# Patient Record
Sex: FEMALE | Race: WHITE | NOT HISPANIC OR LATINO | Employment: UNEMPLOYED | URBAN - METROPOLITAN AREA
[De-identification: names, ages, dates, MRNs, and addresses within clinical notes are randomized per-mention and may not be internally consistent; named-entity substitution may affect disease eponyms.]

---

## 2024-01-23 ENCOUNTER — TELEPHONE (OUTPATIENT)
Dept: MATERNAL FETAL MEDICINE | Facility: CLINIC | Age: 29
End: 2024-01-23
Payer: OTHER GOVERNMENT

## 2024-01-23 NOTE — TELEPHONE ENCOUNTER
Mrs. Herrmann was scheduled (1/18/24) for initial MFM consultation on 2/5/24 for possible retroplacental hematoma on US. She miscarried on the evening of 1/21/24.  1/22/24 - Dr. Short requested MFM consultation for recurrent losses; 2 early SAB's and this loss at 18 weeks. Situation discussed with Dr. Shane Handley and he will call Mrs. Herrmann and do a prepregnancy phone consult with the patient if she is agreeable, due to most insurances will not cover a visit for this.  I called her and explained above, she is very grateful and receptive to speaking with Dr. Handley by phone. She understands that once we receive Dr. Beth's discharge note from delivery, that Dr. Handley will contact her after he reviews her records in [facility]., and that it may take a week or more due to his busy schedule. She states understanding and agreement.

## 2024-03-07 ENCOUNTER — TELEPHONE (OUTPATIENT)
Dept: MATERNAL FETAL MEDICINE | Facility: CLINIC | Age: 29
End: 2024-03-07
Payer: OTHER GOVERNMENT

## 2024-03-07 NOTE — TELEPHONE ENCOUNTER
We received a request for pre pregnancy consultation on the above captioned patient from Dr. Judit Short at [facility].  I offered to do it via review of her records and phone call a free of charge.    She is 28 years of age a  3 para 0030.  She had 2 early 1st trimester losses and then an 18 week loss.  She had a a known uterine septum that was apparently diagnosed after her 1st loss.  She never had evaluation for possible removal of it.    I discussed with the patient that uterine septums are known causes of 1st trimester and 2nd trimester losses.  If it gestation implants on the uterine septum the likelihood of early losses high because the septum typically has a poor blood supply compared to that of normal endometrial tissue.  Whether not played a role in the 18 week loss is difficult to know.  Looks like there was a retroplacental hematoma and then cervical shortening, bleeding and then subsequent loss.  Based on the records I roots reviewed it was not consistent with cervical incompetence    My recommendations are as follows:  1. Hysterosalpingogram or sonohystogram or would ever the current preferred method is for evaluating for the presence of uterine septums.   2. If the septum is present I would refer to reproductive endocrinologist for possible septum excision which is greatly improved the likelihood of a good outcome with future pregnancies.  3. Skipping step 1 above is also completely acceptable and just directly referring to reproductive endocrinologist and they can perform the imaging evaluation  4. When she becomes pregnant again we would like to see her around 14-16 weeks gestation    Thank you very much for the referral and please call with questions.    Shane Handley Jr., MD  Director of Maternal Fetal Medicine  [facility], [facility]  Main: [phone]  Fax: [phone]

## 2024-08-22 DIAGNOSIS — Q51.28 SEPTATE UTERUS AFFECTING PREGNANCY IN SECOND TRIMESTER: Primary | ICD-10-CM

## 2024-08-22 DIAGNOSIS — O34.02 SEPTATE UTERUS AFFECTING PREGNANCY IN SECOND TRIMESTER: Primary | ICD-10-CM

## 2024-09-09 ENCOUNTER — OFFICE VISIT (OUTPATIENT)
Dept: MATERNAL FETAL MEDICINE | Facility: CLINIC | Age: 29
End: 2024-09-09
Payer: OTHER GOVERNMENT

## 2024-09-09 VITALS
SYSTOLIC BLOOD PRESSURE: 110 MMHG | RESPIRATION RATE: 18 BRPM | OXYGEN SATURATION: 99 % | HEIGHT: 59 IN | WEIGHT: 96 LBS | HEART RATE: 78 BPM | DIASTOLIC BLOOD PRESSURE: 70 MMHG | BODY MASS INDEX: 19.35 KG/M2

## 2024-09-09 DIAGNOSIS — Q51.28 SEPTATE UTERUS AFFECTING PREGNANCY IN SECOND TRIMESTER: ICD-10-CM

## 2024-09-09 DIAGNOSIS — O34.02 SEPTATE UTERUS AFFECTING PREGNANCY IN SECOND TRIMESTER: ICD-10-CM

## 2024-09-09 RX ORDER — PYRIDOXINE HCL (VITAMIN B6) 25 MG
TABLET ORAL
COMMUNITY
Start: 2024-08-20

## 2024-09-09 RX ORDER — ONDANSETRON HYDROCHLORIDE 4 MG/1
4 TABLET, FILM COATED ORAL
COMMUNITY
Start: 2024-01-17

## 2024-09-09 RX ORDER — PNV,CALCIUM 72/IRON/FOLIC ACID 27 MG-1 MG
TABLET ORAL
COMMUNITY
Start: 2024-07-09

## 2024-09-09 RX ORDER — ACETAMINOPHEN 325 MG/1
TABLET ORAL
COMMUNITY
Start: 2024-01-22 | End: 2025-01-21

## 2024-09-09 NOTE — LETTER
September 9, 2024        [facility] - Maternal Fetal Medicine  [address]  [place] [address]  Phone: [phone]  Fax: [phone]   Patient: Kierra Herrmann   MR Number: [id number]   YOB: 1995   Date of Visit: 9/9/2024       Dear Dr. Elisabeth Ferrell:    Thank you for referring Kierra Herrmann to me for evaluation. Attached you will find relevant portions of my assessment and plan of care.    If you have questions, please do not hesitate to call me. I look forward to following Kierra Herrmann along with you.    Sincerely,      Rich Ontiveros MD            [facility]

## 2024-09-09 NOTE — PROGRESS NOTES
Initial M Consultation Note  Rich Ontiveros MD    Reason for consultation:    1. Pregnancy at fifteen weeks gestation  2. Recurrent pregnancy loss  3. Apparent uterine septum.  4. History of anxiety, depression, and bipolar disease   5. Short interval between pregnancies     Dear Dr. Ferrell    Today, I had the opportunity to see your patient for initial Maternal Fetal medicine assessment at [facility].  I appreciate your request for both imaging and consultation.Your patient is a 28-year-old  4 para 0030.  Her last pregnancy was a loss at approximately 18 weeks secondary to ruptured membranes.  The patient did have a pre conceptual consult with my partner Dr. Handley in March.  His recommendation was for further evaluation of the uterine septum and potential removal by hysteroscope.  That did not happen and the patient became spontaneously pregnant.    Past medical history:  The patient denies major medical illnesses such as hypertension, diabetes, cardiac disease, pulmonary disease, renal disease, autoimmune disease, spontaneous thrombosis, thyroid dysfunction, and neurologic disease.    Obstetric history:  Two early pregnancy losses followed by an 18 week loss secondary to ruptured membranes.  Her current pregnancy did have a subchorionic bleed early in pregnancy.  Early imaging showed the pregnancy to be on the left side of the uterus.    Family history:  Noncontributory.  The patient denies a history of congenital cardiac disease, open neural tube defect, aneuploidy, Down syndrome, and common single gene disorders.    Social history:   The patient denies tobacco use, alcohol use, and recreational drug use.    Review of systems:  The patient has no somatic complaints today.  She denies vaginal bleeding, leakage of fluid, and pelvic pressure.  Overall, she states she feels well.    Physical examination  General:  Is a well-developed well-nourished female in no apparent distress  Vital  signs:  Blood pressure 110/70, pulse 78, respirations 18, weight 96 lb.  HEENT:  Grossly within normal limits.  There is no obvious facial edema.  The sclera appear normal.  The facial muscles appear normal.  Abdomen:  Benign exam.  The uterus is nontender to palpation.  The uterus is appropriate size for gestational age.  Extremities:  No ankle edema is palpable.  Neuro:  Grossly intact.  The patient is alert and oriented x3.  She ambulates without issue.  Psych:  The patient is appropriate for mood and affect.    Imaging:  Fetal imaging was provided today.  A full ultrasound report has been created on the [employer] system.  The full report will be placed in the EMR and also forwarded to you separately.  In summary, initial imaging is indicative of a normal developing fetus.  Anatomic visualization of 15 weeks was limited.  The fluid volume is normal.  The placenta is anterior without evidence of subchorionic hemorrhage today.  The uterine septum is part of the mullerian group of uterine abnormalities.  It can include a cervical problem.  Therefore we proceeded with a vaginal probe assessment of the cervix.  The anatomic position of the cervix was normal.  The cervical length was normal at 37 mm.  No funneling of the internal cervical os was noted.        Counseling:  I discussed the clinical situation with the patient.  I informed her that I was happy with the progress of this pregnancy and that I did not see a septum.  Hopefully, it has been compressed by the pregnancy in his over to 1 side.  However, the patient was advised that there is still a chance for cervical change and rupture of membranes.  I advised that I did not see any evidence of a retroplacental bleed and this is reassuring.  I counseled the patient that regardless of the result of this pregnancy, she should definitely go on contraception, either short-acting her long-acting, and have the uterine septum removed.  She was advised that we will see  her back here in 3 weeks to reassess the pregnancy with a vaginal probe assessment of the cervix and also an assessment of fetal growth and development    Impression:  Pregnancy at fifteen weeks with a uterine septum.  Three prior pregnancy losses.  This pregnancy appears to be progressing well and has a normal cervical length.    Recommendations:    1. With your permission we will see the patient back in 3 weeks    2. If the patient has symptoms before then, please give us a call and we can try to work the patient and to our schedule in [place] as the patient may require a cerclage and we can do that in [place].    3. As per the body of the consultation, patient was counseled that the completion of this pregnancy she should initiate contraception and undergo hysteroscopy with a probable removal of the uterine septum.      Please call me or one of my partners in [place] if you have any question about your patient at [phone].  Thank you very much for the opportunity to see your patient.    Sincerely, Rcih Ontiveros MD

## 2024-09-09 NOTE — PROGRESS NOTES
"Kierra is here for initial MFM consultation, referred by Dr. Elisabeth Ferrell at [facility] for poor OB history, possible cerclage candidate, L ovarian echogenic foci, septate uterus, and small subchorionic hemorrhage.    She is not feeling fetal movement.    Kierra denies vaginal bleeding, loss of fluid, recurrent cramping.    She is taking Zofran prn.      Vitals:    09/09/24 1053   BP: 110/70   Pulse: 78   Resp: 18   SpO2: 99%   Weight: 43.5 kg (96 lb)   Height: 4' 11" (1.499 m)      BMI:                    19.39 kg/m^2               "

## 2024-09-24 DIAGNOSIS — O34.02 SEPTATE UTERUS AFFECTING PREGNANCY IN SECOND TRIMESTER: Primary | ICD-10-CM

## 2024-09-24 DIAGNOSIS — Q51.28 SEPTATE UTERUS AFFECTING PREGNANCY IN SECOND TRIMESTER: Primary | ICD-10-CM

## 2024-10-03 ENCOUNTER — PROCEDURE VISIT (OUTPATIENT)
Dept: MATERNAL FETAL MEDICINE | Facility: CLINIC | Age: 29
End: 2024-10-03
Payer: OTHER GOVERNMENT

## 2024-10-03 VITALS
HEART RATE: 95 BPM | WEIGHT: 99 LBS | OXYGEN SATURATION: 98 % | RESPIRATION RATE: 20 BRPM | SYSTOLIC BLOOD PRESSURE: 106 MMHG | BODY MASS INDEX: 20 KG/M2 | DIASTOLIC BLOOD PRESSURE: 72 MMHG

## 2024-10-03 DIAGNOSIS — Q51.28 SEPTATE UTERUS AFFECTING PREGNANCY IN SECOND TRIMESTER: ICD-10-CM

## 2024-10-03 DIAGNOSIS — O34.02 SEPTATE UTERUS AFFECTING PREGNANCY IN SECOND TRIMESTER: ICD-10-CM

## 2024-10-03 NOTE — PROGRESS NOTES
Follow-Up MFM Consultation Note    Reason for consultation:    1. Pregnancy at 18 weeks and 4 days   2. Recurrent pregnancy loss  3. Possible uterine septum.  4. History of anxiety, depression, and bipolar disease   5. Short interval between pregnancies     Dear Dr. Ferrell    Today, I had the opportunity to see your patient for follow-up Maternal Fetal medicine assessment at [facility].  I appreciate your request for both imaging and consultation.Your patient is a 28-year-old  4 para 0030.  Her last pregnancy was a loss at approximately 18 weeks secondary to ruptured membranes.  The patient did have a pre conceptual consult with my partner Dr. Handley in March.  His recommendation was for further evaluation of the uterine septum and potential removal by hysteroscope.  That did not happen and the patient became spontaneously pregnant. With her last visit here we could not appreciate the uterine septum.        Review of systems:  The patient has no somatic complaints today.  She denies vaginal bleeding, leakage of fluid, and pelvic pressure.  Overall, she states she feels well.    Physical examination  /72   Pulse 95   Resp 20   Wt 44.9 kg (99 lb)   SpO2 98%   BMI 20.00 kg/m²    Physical exam:  Gen: WDWN in NAD  HEENT: WNL  Abdomen: Soft, non-tender, non-distended   Skin: No rash or jaundice  Extremities: Symmetric in size, no clubbing, cyanosis, or edema  Neuro: Grossly intact      Imaging:  Single intrauterine pregnancy measuring 17 weeks and 6 days with an estimated fetal weight of 217g.  This is consistent with a previously determined ROQUE.  The fetus is in the vertex presentation.  The placenta is anterior.  The amniotic fluid is normal.  Completion of the anatomical survey was performed and there are no structural anomalies or aneuploidy on ultrasound today.  TVUS was used to assess the cervical length. It is normal at 39.0mm without evidence of funneling or shortening    Impression:  Reassuring fetal growth, fluid and anatomy. Normal Cervical length       Recommendations:    1. With your permission we will see the patient back in 4 weeks to reassess the cervical length one last time.     2. Please repeat cervical length in your office in 2 weeks.  If it were to be less than 2.5cm please let us know and we can arrange for a cervical cerclage.      3. As per the body of the consultation, patient was counseled that the completion of this pregnancy she should initiate contraception and undergo hysteroscopy with a probable removal of the uterine septum.      Please call me or one of my partners in [place] if you have any question about your patient at [phone].  Thank you very much for the opportunity to see your patient.    Sincerely,     Faby Rockwell, DO

## 2024-10-03 NOTE — LETTER
October 3, 2024        [facility] - Maternal Fetal Medicine  [address]  [place] [address]  Phone: [phone]  Fax: [phone]   Patient: Kierra Herrmann   MR Number: [id number]   YOB: 1995   Date of Visit: 10/3/2024       Dear Dr. Elisabeth Ferrell:    Thank you for referring Kierra Herrmann to me for evaluation. Below are the relevant portions of my assessment and plan of care.            If you have questions, please do not hesitate to call me. I look forward to following Kierra along with you.    Sincerely,      Faby Rockwell, DO           CC  [employer]

## 2024-10-03 NOTE — PROGRESS NOTES
Kierra is here for followup [facility] consultation for poor OB history and septate uterus, referred by Dr. Elisabeth Ferrell at [facility].    She is not yet feeling fetal movement.    Kierra denies vaginal bleeding, loss of fluid, recurrent cramping.    Vitals:    10/03/24 1027   BP: 106/72   Pulse: 95   Resp: 20   SpO2: 98%   Weight: 44.9 kg (99 lb)     BMI:                    20 kg/m^2

## 2024-10-22 DIAGNOSIS — Q51.28 SEPTATE UTERUS AFFECTING PREGNANCY IN SECOND TRIMESTER: Primary | ICD-10-CM

## 2024-10-22 DIAGNOSIS — O34.02 SEPTATE UTERUS AFFECTING PREGNANCY IN SECOND TRIMESTER: Primary | ICD-10-CM

## 2024-10-22 DIAGNOSIS — O09.299 PRIOR POOR OBSTETRICAL HISTORY, ANTEPARTUM: ICD-10-CM

## 2024-11-07 ENCOUNTER — PROCEDURE VISIT (OUTPATIENT)
Dept: MATERNAL FETAL MEDICINE | Facility: CLINIC | Age: 29
End: 2024-11-07
Payer: OTHER GOVERNMENT

## 2024-11-07 VITALS
SYSTOLIC BLOOD PRESSURE: 106 MMHG | WEIGHT: 104 LBS | HEART RATE: 92 BPM | DIASTOLIC BLOOD PRESSURE: 64 MMHG | RESPIRATION RATE: 20 BRPM | BODY MASS INDEX: 21.01 KG/M2

## 2024-11-07 DIAGNOSIS — Q51.28 SEPTATE UTERUS AFFECTING PREGNANCY IN SECOND TRIMESTER: ICD-10-CM

## 2024-11-07 DIAGNOSIS — O09.299 PRIOR POOR OBSTETRICAL HISTORY, ANTEPARTUM: ICD-10-CM

## 2024-11-07 DIAGNOSIS — O34.02 SEPTATE UTERUS AFFECTING PREGNANCY IN SECOND TRIMESTER: ICD-10-CM

## 2024-11-07 DIAGNOSIS — O36.5920 POOR FETAL GROWTH AFFECTING MANAGEMENT OF MOTHER IN SECOND TRIMESTER, SINGLE OR UNSPECIFIED FETUS: Primary | ICD-10-CM

## 2024-11-07 NOTE — LETTER
November 7, 2024        [facility] - Maternal Fetal Medicine  [address]  [place] [address]  Phone: [phone]  Fax: [phone]   Patient: Kierra Herrmann   MR Number: [id number]   YOB: 1995   Date of Visit: 11/7/2024       Dear Dr. Elisabeth Ferrell:    Thank you for referring Kierra Herrmann to me for evaluation. Below are the relevant portions of my assessment and plan of care.            If you have questions, please do not hesitate to call me. I look forward to following Kierra along with you.    Sincerely,      Faby Rockwell, DO           CC  [employer]

## 2024-11-07 NOTE — PROGRESS NOTES
Kierra is here for followup M consultation for septate uterus and poor OB history, referred by Dr. Elisabeth Ferrell, at [facility].    She is feeling fetal movement.    Kierra denies vaginal bleeding, loss of fluid, recurrent contractions.    She takes Unisom at night prn for sleep and nausea, and Tylenol prn.    Vitals:    11/07/24 1021   BP: 106/64   Pulse: 92   Resp: 20   SpO2: Comment: unable to measure due to long acrylic nails   Weight: 47.2 kg (104 lb)     BMI:                    21.01 kg/m^2

## 2024-11-25 DIAGNOSIS — O36.5920 POOR FETAL GROWTH AFFECTING MANAGEMENT OF MOTHER IN SECOND TRIMESTER, SINGLE OR UNSPECIFIED FETUS: ICD-10-CM

## 2024-11-25 DIAGNOSIS — Q51.28 SEPTATE UTERUS AFFECTING PREGNANCY IN SECOND TRIMESTER: Primary | ICD-10-CM

## 2024-11-25 DIAGNOSIS — O09.299 PRIOR POOR OBSTETRICAL HISTORY, ANTEPARTUM: ICD-10-CM

## 2024-11-25 DIAGNOSIS — O34.02 SEPTATE UTERUS AFFECTING PREGNANCY IN SECOND TRIMESTER: Primary | ICD-10-CM

## 2024-12-05 ENCOUNTER — PROCEDURE VISIT (OUTPATIENT)
Dept: MATERNAL FETAL MEDICINE | Facility: CLINIC | Age: 29
End: 2024-12-05
Payer: OTHER GOVERNMENT

## 2024-12-05 VITALS
OXYGEN SATURATION: 98 % | WEIGHT: 104 LBS | RESPIRATION RATE: 20 BRPM | HEART RATE: 84 BPM | SYSTOLIC BLOOD PRESSURE: 106 MMHG | DIASTOLIC BLOOD PRESSURE: 64 MMHG | BODY MASS INDEX: 21.01 KG/M2

## 2024-12-05 DIAGNOSIS — O09.299 PRIOR POOR OBSTETRICAL HISTORY, ANTEPARTUM: ICD-10-CM

## 2024-12-05 DIAGNOSIS — Q51.28 SEPTATE UTERUS AFFECTING PREGNANCY IN SECOND TRIMESTER: ICD-10-CM

## 2024-12-05 DIAGNOSIS — O36.5920 POOR FETAL GROWTH AFFECTING MANAGEMENT OF MOTHER IN SECOND TRIMESTER, SINGLE OR UNSPECIFIED FETUS: ICD-10-CM

## 2024-12-05 DIAGNOSIS — O34.02 SEPTATE UTERUS AFFECTING PREGNANCY IN SECOND TRIMESTER: ICD-10-CM

## 2024-12-05 RX ORDER — FERROUS SULFATE 325(65) MG
TABLET ORAL
COMMUNITY
Start: 2024-11-21

## 2024-12-05 NOTE — LETTER
December 5, 2024        [facility] - Maternal Fetal Medicine  [address]  [place] [address]  Phone: [phone]  Fax: [phone]   Patient: Kierra Herrmann   MR Number: [id number]   YOB: 1995   Date of Visit: 12/5/2024       Dear Dr. Elisabeth Ferrell:    Thank you for referring Kierra Herrmann to me for evaluation. Below are the relevant portions of my assessment and plan of care.            If you have questions, please do not hesitate to call me. I look forward to following Kierra along with you.    Sincerely,      Rich Eason MD           [facility]

## 2024-12-05 NOTE — PROGRESS NOTES
Reason For Consultation    Pregnancy at 27w4d weeks gestation   Recurrent pregnancy loss  Possible uterine septum   Bipolar  Short interval between pregnancies    Provider requesting consultation:  Emani Ferrell MD    Dear Dr. Ferrell,     I had the pleasure of seeing Kierra Herrmann at the [facility] Maternal Fetal Medicine center. I appreciate your request for follow up imaging and consultation.   Your patient is a 29 y.o. at 27w4d.  Estimated Date of Delivery: 3/2/25.  Today, the patient is doing well.  She shows no evidence of preeclampsia or premature labor.  Her mood appears stable.    Review of systems:  The patient denies vaginal bleeding, leakage of amniotic fluid, contractions, headache, visual change, and right upper quadrant pain.    Physical exam:  Vitals:    24 1042   BP: 106/64   Pulse: 84   Resp: 20   SpO2: 98%   Weight: 47.2 kg (104 lb)     Body mass index is 21.01 kg/m².    General:  The patient is a well developed gravid female in no apparent distress.  HEENT: Grossly within normal limits.  No facial edema.  The sclera appear normal.  Abdomen: Exam is benign.  Uterus non tender to palpation.  No masses noted  Extremities: No significant peripheral edema  Neuro: Grossly intact.  Alert and oriented.  Ambulates without issue  Psych:  The patient is appropriate for both mood and affect    Ultrasound: A repeat maternal fetal medicine ultrasound was performed today.  Please SEE THE FULL ULTRASOUND REPORT IN THE EPIC SYSTEM for full details.  In summary, imaging today reveals normal fetal growth.  The fetus shows no evidence of compromise as it is active throughout the study.  The fluid volume is normal.  No anatomic abnormalities noted.      Impression:  My impression today is 1 of reassurance because all looks well with both the patient and the fetus in utero.  There is no evidence of growth disturbance or anatomic abnormality.  The abdominal circumference is at the lower  end of normal.  The maternal condition stable without evidence of preeclampsia or of premature labor.    Recommendations:  At this time no follow-up in the [facility] is advised to scheduled   I would recommend a growth study in your office in 5 weeks.  If there is evidence of growth restriction on the next imaging assessment, then the patient will be a candidate for fetal surveillance with a biophysical profile weekly.  If problems arise that require assistance we would be happy to see the patient back at any time.    We greatly appreciate you allowing us to assist in her care.  Please call with any questions or concerns.    Sincerely,     Rich Ontiveros MD  Maternal Fetal Medicine    I spent a total of 22 minutes on this visit. This includes face to face time and non-face to face time preparing to see the patient (eg, review of tests), obtaining and/or reviewing separately obtained history, documenting clinical information in the electronic or other health record, independently interpreting results and communicating results to the patient/family/caregiver, or care coordinator.

## 2024-12-05 NOTE — PROGRESS NOTES
Kierra is here for followup [facility] consultation for septate uterus and poor OB history, referred by Dr. Elisabeth Ferrell at [facility].    She is feeling fetal movement.    Kierra denies vaginal bleeding, loss of fluid, recurrent contractions.    She is taking a prenatal vitamin and Fe supplement daily, Unisom QHS prn nausea, and Tylenol prn discomfort.    Vitals:    12/05/24 1042   BP: 106/64   Pulse: 84   Resp: 20   SpO2: 98%   Weight: 47.2 kg (104 lb)     BMI:                    21.01 kg/m^2

## 2025-01-22 ENCOUNTER — OUTSIDE PLACE OF SERVICE (OUTPATIENT)
Dept: OBSTETRICS AND GYNECOLOGY | Facility: CLINIC | Age: 30
End: 2025-01-22

## 2025-01-22 PROCEDURE — 59025 FETAL NON-STRESS TEST: CPT | Mod: 26,,, | Performed by: OBSTETRICS & GYNECOLOGY

## 2025-01-22 PROCEDURE — 99222 1ST HOSP IP/OBS MODERATE 55: CPT | Mod: 25,,, | Performed by: OBSTETRICS & GYNECOLOGY

## 2025-01-24 ENCOUNTER — OUTSIDE PLACE OF SERVICE (OUTPATIENT)
Dept: OBSTETRICS AND GYNECOLOGY | Facility: CLINIC | Age: 30
End: 2025-01-24

## 2025-01-24 PROCEDURE — 99238 HOSP IP/OBS DSCHRG MGMT 30/<: CPT | Mod: ,,, | Performed by: OBSTETRICS & GYNECOLOGY
